# Patient Record
Sex: FEMALE | Race: BLACK OR AFRICAN AMERICAN | NOT HISPANIC OR LATINO
[De-identification: names, ages, dates, MRNs, and addresses within clinical notes are randomized per-mention and may not be internally consistent; named-entity substitution may affect disease eponyms.]

---

## 2023-11-17 DIAGNOSIS — Z00.00 ENCOUNTER FOR GENERAL ADULT MEDICAL EXAMINATION W/OUT ABNORMAL FINDINGS: ICD-10-CM

## 2023-11-22 ENCOUNTER — APPOINTMENT (OUTPATIENT)
Dept: HEMATOLOGY ONCOLOGY | Facility: CLINIC | Age: 48
End: 2023-11-22
Payer: MEDICARE

## 2023-11-22 VITALS
WEIGHT: 140 LBS | DIASTOLIC BLOOD PRESSURE: 68 MMHG | HEIGHT: 67 IN | TEMPERATURE: 98.5 F | SYSTOLIC BLOOD PRESSURE: 134 MMHG | OXYGEN SATURATION: 96 % | RESPIRATION RATE: 16 BRPM | BODY MASS INDEX: 21.97 KG/M2 | HEART RATE: 66 BPM

## 2023-11-22 DIAGNOSIS — Z86.2 PERSONAL HISTORY OF DISEASES OF THE BLOOD AND BLOOD-FORMING ORGANS AND CERTAIN DISORDERS INVOLVING THE IMMUNE MECHANISM: ICD-10-CM

## 2023-11-22 DIAGNOSIS — Z87.19 PERSONAL HISTORY OF OTHER DISEASES OF THE DIGESTIVE SYSTEM: ICD-10-CM

## 2023-11-22 DIAGNOSIS — M15.9 POLYOSTEOARTHRITIS, UNSPECIFIED: ICD-10-CM

## 2023-11-22 DIAGNOSIS — D57.01 HB-SS DISEASE WITH ACUTE CHEST SYNDROME: ICD-10-CM

## 2023-11-22 DIAGNOSIS — Z87.39 PERSONAL HISTORY OF OTHER DISEASES OF THE MUSCULOSKELETAL SYSTEM AND CONNECTIVE TISSUE: ICD-10-CM

## 2023-11-22 DIAGNOSIS — Z87.898 PERSONAL HISTORY OF OTHER SPECIFIED CONDITIONS: ICD-10-CM

## 2023-11-22 DIAGNOSIS — Z83.2 FAMILY HISTORY OF DISEASES OF THE BLOOD AND BLOOD-FORMING ORGANS AND CERTAIN DISORDERS INVOLVING THE IMMUNE MECHANISM: ICD-10-CM

## 2023-11-22 DIAGNOSIS — Z78.9 OTHER SPECIFIED HEALTH STATUS: ICD-10-CM

## 2023-11-22 DIAGNOSIS — G43.901 MIGRAINE, UNSPECIFIED, NOT INTRACTABLE, WITH STATUS MIGRAINOSUS: ICD-10-CM

## 2023-11-22 DIAGNOSIS — G43.911 MIGRAINE, UNSPECIFIED, INTRACTABLE, WITH STATUS MIGRAINOSUS: ICD-10-CM

## 2023-11-22 PROCEDURE — 99205 OFFICE O/P NEW HI 60 MIN: CPT

## 2023-11-22 RX ORDER — ALPRAZOLAM 0.25 MG/1
0.25 TABLET ORAL
Refills: 0 | Status: COMPLETED | COMMUNITY
End: 2023-11-22

## 2023-12-20 ENCOUNTER — RESULT REVIEW (OUTPATIENT)
Age: 48
End: 2023-12-20

## 2023-12-20 ENCOUNTER — APPOINTMENT (OUTPATIENT)
Dept: HEMATOLOGY ONCOLOGY | Facility: CLINIC | Age: 48
End: 2023-12-20
Payer: MEDICARE

## 2023-12-20 VITALS
TEMPERATURE: 98 F | HEART RATE: 79 BPM | RESPIRATION RATE: 17 BRPM | BODY MASS INDEX: 21.22 KG/M2 | WEIGHT: 135.19 LBS | OXYGEN SATURATION: 98 % | DIASTOLIC BLOOD PRESSURE: 66 MMHG | HEIGHT: 67 IN | SYSTOLIC BLOOD PRESSURE: 122 MMHG

## 2023-12-20 PROCEDURE — 99214 OFFICE O/P EST MOD 30 MIN: CPT

## 2023-12-20 NOTE — REVIEW OF SYSTEMS
[Palpitations] : palpitations [Joint Pain] : joint pain [Insomnia] : insomnia [Anxiety] : anxiety [Negative] : Heme/Lymph [FreeTextEntry5] : provoked by pain meds and anxiety  [FreeTextEntry9] : osteoarthritis  [de-identified] : difficulty falling asleep

## 2023-12-20 NOTE — HISTORY OF PRESENT ILLNESS
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 Kiah Newton Patient seen as an adult by Dr. Ruiz  Patient transfused in the following hospitals: [facility] History of reactions-no Patient has history of acute chest syndrome wo intubation and cholecystectomy.  Denies CVA, Leg ulcers, Diabetes, thrombosis, cholecystectomy, AVN w/wo surgery, retinopathy.   Pregnancy x2 Last optho appointment 2022  Takes melatonin 10mg and Benadryl at night for the last year   11/22/2023 Patient complains of knee pain. Had xray found osteoarthritis. Had steroid injection into knee. Given steroids, recommended PT. PT recommended by orthopedics at [facility] with Dr. Talamantes.  Patient recommended for follow up in 3 months. Has chronic in lower back, has had MRI and xrays. Had steroid injections, takes pain medication.  Taking oxycodone. Recent in the ED/hospital in 2022. Transfused blood. Had infections.  Takes hydroxyurea 500mg once a day since August.  [de-identified] : 12/20/2023; Started hydroxyurea no complaints Has insomnia, anxiety improved. Taking melatonin and benadryl

## 2023-12-20 NOTE — CONSULT LETTER
[Dear  ___] : Dear ~SAMREEN, [Sincerely,] : Sincerely, [FreeTextEntry1] : Vidhya Amaro is a patient of mine with sickle cell disease.  Her plan for pain is below. ED plan 1/2 normal saline 2 liters Initial pain medication: Hydromorphone 2mg or Morphine 6mg IV, if partial relief can continue same dose for 3 doses  If no relief from initial dose, can escalate up to hydromorphone 3mg or morphine 8mg for second, if partial relief can give additional dose for total of 3 doses If no relief from second escalated dose and if patient needs escalation to hydromorphone  4mg or morphine 10mg for 3rd dose , patient should get admitted to hospital  Recommend giving up to 3 doses of either hydromorphone and morphine, with 1/2 normal saline 2 liters. If no improvement will need admission. For headaches with pain medication can give fioricet.    [FreeTextEntry3] : Jane Garrett MD

## 2023-12-20 NOTE — ASSESSMENT
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   #Sickle cell disease discussed disease modifying medications including hydroxyurea, l-glutamine, voxelotor, crizanlizumab increase hydroxyurea to 1000mg Patient is not Candidate for transplant/gene therapy at this time     #Pain given pain contract on 11/22/2023 continue oxycodone Istop reviewed ED plan 1/2 normal saline 2 liters Initial pain medication: Hydromorphone 2mg or Morphine 6mg IV, if partial relief can continue same dose for 3 doses  If no relief from initial dose, can escalate up to hydromorphone 3mg or morphine 8mg for second, if partial relief can give additional dose for total of 3 doses If no relief from second escalated dose and if patient needs escalation to hydromorphone  4mg or morphine 10mg for 3rd dose , patient should get admitted to hospital  Recommend giving up to 3 doses of either hydromorphone and morphine, with 1/2 normal saline 2 liters. If no improvement will need admission. For headaches with pain medication can give fioricet.    #Insomnia -referred to Dr. Ko recommended tart cherry juice and magnesium spray    #HCM Last optho appointment 2022 Last pneumonia vaccine unknown Last urine protein/microalbumin unknown Has PCP/OB/gyn unknown   #Next appointment 4-6 weeks with labs   #Labs at next appointment CBC, retic, iron, ferritin, urinalysis, urine toxicology, hgb electrophoresis, CMP, LDH

## 2023-12-21 ENCOUNTER — NON-APPOINTMENT (OUTPATIENT)
Age: 48
End: 2023-12-21

## 2024-01-31 ENCOUNTER — RESULT REVIEW (OUTPATIENT)
Age: 49
End: 2024-01-31

## 2024-01-31 ENCOUNTER — APPOINTMENT (OUTPATIENT)
Dept: HEMATOLOGY ONCOLOGY | Facility: CLINIC | Age: 49
End: 2024-01-31
Payer: COMMERCIAL

## 2024-01-31 VITALS
HEIGHT: 67 IN | HEART RATE: 75 BPM | DIASTOLIC BLOOD PRESSURE: 54 MMHG | OXYGEN SATURATION: 98 % | SYSTOLIC BLOOD PRESSURE: 110 MMHG | TEMPERATURE: 97.7 F | WEIGHT: 132.25 LBS | BODY MASS INDEX: 20.76 KG/M2 | RESPIRATION RATE: 16 BRPM

## 2024-01-31 PROCEDURE — 99214 OFFICE O/P EST MOD 30 MIN: CPT

## 2024-01-31 RX ORDER — LIDOCAINE 5% 700 MG/1
5 PATCH TOPICAL
Qty: 30 | Refills: 0 | Status: COMPLETED | COMMUNITY
End: 2024-01-31

## 2024-01-31 NOTE — ASSESSMENT
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   #Sickle cell disease discussed disease modifying medications including hydroxyurea, l-glutamine, voxelotor, crizanlizumab increase hydroxyurea to 1000mg Patient is not Candidate for transplant/gene therapy at this time     #Pain given pain contract on 11/22/2023 continue oxycodone Istop reviewed ED plan 1/2 normal saline 2 liters Initial pain medication: Hydromorphone 2mg or Morphine 6mg IV, if partial relief can continue same dose for 3 doses  If no relief from initial dose, can escalate up to hydromorphone 3mg or morphine 8mg for second, if partial relief can give additional dose for total of 3 doses If no relief from second escalated dose and if patient needs escalation to hydromorphone  4mg or morphine 10mg for 3rd dose , patient should get admitted to hospital For itching: Phenergan IV, Hydroxyzine IM  Recommend giving up to 3 doses of either hydromorphone and morphine, with 1/2 normal saline 2 liters. If no improvement will need admission. For headaches with pain medication can give fioricet.    #Insomnia -trazodone will start recommended tart cherry juice and magnesium spray    #HCM Last optho appointment 2024 Last pneumonia vaccine next appointment Last urine protein/microalbumin within normal limits Has PCP/OB/gyn unknown   #Next appointment 8 weeks with labs   #Labs at next appointment CBC, retic, iron, ferritin, urinalysis, urine toxicology, hgb electrophoresis, CMP, LDH

## 2024-01-31 NOTE — REVIEW OF SYSTEMS
[Palpitations] : palpitations [Joint Pain] : joint pain [Insomnia] : insomnia [Anxiety] : anxiety [Negative] : Heme/Lymph [Leg Claudication] : intermittent leg claudication [FreeTextEntry5] :  maybe from sickle? [FreeTextEntry9] : osteoarthritis  [de-identified] : difficulty falling asleep

## 2024-01-31 NOTE — HISTORY OF PRESENT ILLNESS
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 Kiah Newton Patient seen as an adult by Dr. Ruiz  Patient transfused in the following hospitals: [facility] History of reactions-no Patient has history of acute chest syndrome wo intubation and cholecystectomy.  Denies CVA, Leg ulcers, Diabetes, thrombosis, cholecystectomy, AVN w/wo surgery, retinopathy.   Pregnancy x2 Last optho appointment 2022  Takes melatonin 10mg and Benadryl at night for the last year   11/22/2023 Patient complains of knee pain. Had xray found osteoarthritis. Had steroid injection into knee. Given steroids, recommended PT. PT recommended by orthopedics at [facility] with Dr. Talamantes.  Patient recommended for follow up in 3 months. Has chronic in lower back, has had MRI and xrays. Had steroid injections, takes pain medication.  Taking oxycodone. Recent in the ED/hospital in 2022. Transfused blood. Had infections.  Takes hydroxyurea 500mg once a day since August.  [de-identified] : 12/20/2023; Started hydroxyurea no complaints Has insomnia, anxiety improved. Taking melatonin and benadryl  1/31/2024 Has not been sleeping Had crisis  on 12/22/2023, did not go to the ED 1/21/2024, had crisis  Taking hydroxyurea 1000mg daily, loose stools Saw opthalmology Dee Woody, retinopathy in both eyes, did not require laser surgery

## 2024-03-20 ENCOUNTER — RESULT REVIEW (OUTPATIENT)
Age: 49
End: 2024-03-20

## 2024-03-20 ENCOUNTER — APPOINTMENT (OUTPATIENT)
Dept: HEMATOLOGY ONCOLOGY | Facility: CLINIC | Age: 49
End: 2024-03-20
Payer: COMMERCIAL

## 2024-03-20 VITALS
DIASTOLIC BLOOD PRESSURE: 70 MMHG | SYSTOLIC BLOOD PRESSURE: 135 MMHG | BODY MASS INDEX: 21.19 KG/M2 | WEIGHT: 135 LBS | HEART RATE: 68 BPM | TEMPERATURE: 98.3 F | HEIGHT: 67 IN | RESPIRATION RATE: 16 BRPM | OXYGEN SATURATION: 96 %

## 2024-03-20 DIAGNOSIS — D57.00 HB-SS DISEASE WITH CRISIS, UNSPECIFIED: ICD-10-CM

## 2024-03-20 DIAGNOSIS — G47.00 INSOMNIA, UNSPECIFIED: ICD-10-CM

## 2024-03-20 PROCEDURE — 99215 OFFICE O/P EST HI 40 MIN: CPT

## 2024-03-20 RX ORDER — IBUPROFEN 800 MG/1
800 TABLET, FILM COATED ORAL
Refills: 0 | Status: ACTIVE | COMMUNITY

## 2024-03-20 NOTE — HISTORY OF PRESENT ILLNESS
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 Kiah Newton Patient seen as an adult by Dr. Ruiz  Patient transfused in the following hospitals: [facility] History of reactions-no Patient has history of acute chest syndrome wo intubation and cholecystectomy splenectomy.  Denies CVA, Leg ulcers, Diabetes, thrombosis, cholecystectomy, AVN w/wo surgery, retinopathy.   Pregnancy x2 Last optho appointment 2022  Takes melatonin 10mg and Benadryl at night for the last year   11/22/2023 Patient complains of knee pain. Had xray found osteoarthritis. Had steroid injection into knee. Given steroids, recommended PT. PT recommended by orthopedics at [facility] with Dr. Talamantes.  Patient recommended for follow up in 3 months. Has chronic in lower back, has had MRI and xrays. Had steroid injections, takes pain medication.  Taking oxycodone. Recent in the ED/hospital in 2022. Transfused blood. Had infections.  Takes hydroxyurea 500mg once a day since August.  [de-identified] : 12/20/2023; Started hydroxyurea no complaints Has insomnia, anxiety improved. Taking melatonin and benadryl  1/31/2024 Has not been sleeping Had crisis  on 12/22/2023, did not go to the ED 1/21/2024, had crisis  Taking hydroxyurea 1000mg daily, loose stools Saw opthalmology Dee Woody, retinopathy in both eyes, did not require laser surgery  3/20/2024 Had pain in elbow started on Saturday Patient turned on fan and possibly hit elbow Elbow was tender and achy Stopped taking oxycodone

## 2024-03-20 NOTE — REVIEW OF SYSTEMS
[Palpitations] : palpitations [FreeTextEntry9] : osteoarthritis  [FreeTextEntry5] :  maybe from sickle? [de-identified] : difficulty falling asleep

## 2024-03-20 NOTE — CONSULT LETTER
[FreeTextEntry1] : Vidhya Amaro is a patient of mine with sickle cell disease.  Her plan for pain is below. ED plan 1/2 normal saline 2 liters Initial pain medication: Hydromorphone 2mg or Morphine 6mg IV, if partial relief can continue same dose for 3 doses  If no relief from initial dose, can escalate up to hydromorphone 3mg or morphine 8mg for second, if partial relief can give additional dose for total of 3 doses If no relief from second escalated dose and if patient needs escalation to hydromorphone  4mg or morphine 10mg for 3rd dose , patient should get admitted to hospital  Recommend giving up to 3 doses of either hydromorphone and morphine, with 1/2 normal saline 2 liters. If no improvement will need admission. For headaches with pain medication can give fioricet.    [FreeTextEntry3] : Jane Garrett MD

## 2024-03-20 NOTE — ASSESSMENT
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  #Pain crisis March 2024 Will do infusion with 1/2 normal saline 2 liters will add morphine, hydroxyzine, pepcid for itching   #Sickle cell disease discussed disease modifying medications including hydroxyurea, l-glutamine, voxelotor, crizanlizumab increase hydroxyurea to 1000mg Patient is not Candidate for transplant/gene therapy at this time  #Blepharoplasty -spoke with plastic surgery office 1 hour anesthesia with propofol will need to have hgb>9 before surgery will see patient week before surgery and transfuse if needed will send patient for EKG will do coags today with pregnancy test, preop visit April     #Pain given pain contract on 11/22/2023 continue oxycodone Istop reviewed ED plan 1/2 normal saline 2 liters Initial pain medication: Hydromorphone 2mg or Morphine 6mg IV, if partial relief can continue same dose for 3 doses  If no relief from initial dose, can escalate up to hydromorphone 3mg or morphine 8mg for second, if partial relief can give additional dose for total of 3 doses If no relief from second escalated dose and if patient needs escalation to hydromorphone  4mg or morphine 10mg for 3rd dose , patient should get admitted to hospital For itching: Phenergan IV, Hydroxyzine IM  Recommend giving up to 3 doses of either hydromorphone and morphine, with 1/2 normal saline 2 liters. If no improvement will need admission. For headaches with pain medication can give fioricet.    #Insomnia -trazodone will start recommended tart cherry juice and magnesium spray    #HCM Last optho appointment 2024 Last pneumonia vaccine next appointment Last urine protein/microalbumin within normal limits Has PCP/OB/gyn unknown   #Next appointment April 2024 with possible transfusion and labs   #Labs at next appointment CBC, retic, iron, ferritin, urinalysis, urine toxicology, hgb electrophoresis, CMP, LDH

## 2024-03-27 ENCOUNTER — APPOINTMENT (OUTPATIENT)
Dept: HEMATOLOGY ONCOLOGY | Facility: CLINIC | Age: 49
End: 2024-03-27

## 2024-04-11 ENCOUNTER — NON-APPOINTMENT (OUTPATIENT)
Age: 49
End: 2024-04-11

## 2024-04-17 ENCOUNTER — RESULT REVIEW (OUTPATIENT)
Age: 49
End: 2024-04-17

## 2024-04-17 ENCOUNTER — APPOINTMENT (OUTPATIENT)
Dept: HEMATOLOGY ONCOLOGY | Facility: CLINIC | Age: 49
End: 2024-04-17
Payer: COMMERCIAL

## 2024-04-17 VITALS
RESPIRATION RATE: 17 BRPM | DIASTOLIC BLOOD PRESSURE: 64 MMHG | TEMPERATURE: 97.7 F | SYSTOLIC BLOOD PRESSURE: 135 MMHG | BODY MASS INDEX: 21.43 KG/M2 | HEIGHT: 67 IN | WEIGHT: 136.56 LBS | HEART RATE: 79 BPM | OXYGEN SATURATION: 99 %

## 2024-04-17 DIAGNOSIS — Z01.818 ENCOUNTER FOR OTHER PREPROCEDURAL EXAMINATION: ICD-10-CM

## 2024-04-17 DIAGNOSIS — G89.29 LOW BACK PAIN, UNSPECIFIED: ICD-10-CM

## 2024-04-17 DIAGNOSIS — M54.50 LOW BACK PAIN, UNSPECIFIED: ICD-10-CM

## 2024-04-17 DIAGNOSIS — M17.11 UNILATERAL PRIMARY OSTEOARTHRITIS, RIGHT KNEE: ICD-10-CM

## 2024-04-17 PROCEDURE — 99214 OFFICE O/P EST MOD 30 MIN: CPT

## 2024-04-18 NOTE — HISTORY OF PRESENT ILLNESS
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 Kiah Newton Patient seen as an adult by Dr. Ruiz  Patient transfused in the following hospitals: [facility] History of reactions-no Patient has history of acute chest syndrome wo intubation and cholecystectomy splenectomy.  Denies CVA, Leg ulcers, Diabetes, thrombosis, cholecystectomy, AVN w/wo surgery, retinopathy.   Pregnancy x2 Last optho appointment 2022  Takes melatonin 10mg and Benadryl at night for the last year   11/22/2023 Patient complains of knee pain. Had xray found osteoarthritis. Had steroid injection into knee. Given steroids, recommended PT. PT recommended by orthopedics at [facility] with Dr. Talamantes.  Patient recommended for follow up in 3 months. Has chronic in lower back, has had MRI and xrays. Had steroid injections, takes pain medication.  Taking oxycodone. Recent in the ED/hospital in 2022. Transfused blood. Had infections.  Takes hydroxyurea 500mg once a day since August.  [de-identified] : 12/20/2023; Started hydroxyurea no complaints Has insomnia, anxiety improved. Taking melatonin and benadryl  1/31/2024 Has not been sleeping Had crisis  on 12/22/2023, did not go to the ED 1/21/2024, had crisis  Taking hydroxyurea 1000mg daily, loose stools Saw opthalmology Dee Woody, retinopathy in both eyes, did not require laser surgery  3/20/2024 Had pain in elbow started on Saturday Patient turned on fan and possibly hit elbow Elbow was tender and achy Stopped taking oxycodone  4/17/2024 if hgb<9, will do transfusion before May 1st procedure taking hydroxyurea, every month takes 3 month denies reactions does not require premedications feels fine has low back pain, took oxycodond 5mg -->recommend tiger balm for lower back

## 2024-04-18 NOTE — ASSESSMENT
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 Vinita garibay Address: [address] [place] [address] Hours:  Open  Closes 4PM Phone: [phone]  #Preopeartive Blepharoplasty -spoke with plastic surgery office 1 hour anesthesia with propofol will need to have hgb>9 before surgery, per CHARLY 2020 guidelines for sickle cell disease Hgb<9 today, will schedule for transfusion will send patient for EKG will do coags today with pregnancy test  #Sickle cell disease discussed disease modifying medications including hydroxyurea, l-glutamine, voxelotor, crizanlizumab c/w hydroxyurea to 1000mg Patient is not Candidate for transplant/gene therapy at this time       #Pain given pain contract on 11/22/2023 continue oxycodone Istop reviewed ED plan 1/2 normal saline 2 liters Initial pain medication: Hydromorphone 2mg or Morphine 6mg IV, if partial relief can continue same dose for 3 doses  If no relief from initial dose, can escalate up to hydromorphone 3mg or morphine 8mg for second, if partial relief can give additional dose for total of 3 doses If no relief from second escalated dose and if patient needs escalation to hydromorphone  4mg or morphine 10mg for 3rd dose , patient should get admitted to hospital For itching: Phenergan IV, Hydroxyzine IM  Recommend giving up to 3 doses of either hydromorphone and morphine, with 1/2 normal saline 2 liters. If no improvement will need admission. For headaches with pain medication can give fioricet.    #Insomnia -trazodone will start recommended tart cherry juice and magnesium spray    #HCM Last optho appointment 2024 Last pneumonia vaccine next appointment Last urine protein/microalbumin within normal limits Has PCP/OB/gyn unknown   #Next appointment April 2024 with possible transfusion and labs   #Labs at next appointment CBC, retic, iron, ferritin, urinalysis, urine toxicology, hgb electrophoresis, CMP, LDH  if hgb<9, will do transfusion before May 1st procedure

## 2024-04-18 NOTE — REVIEW OF SYSTEMS
[Palpitations] : palpitations [Joint Pain] : joint pain [Insomnia] : insomnia [Anxiety] : anxiety [Negative] : Heme/Lymph [FreeTextEntry5] :  maybe from sickle? [FreeTextEntry9] : osteoarthritis  [de-identified] : difficulty falling asleep

## 2024-04-23 ENCOUNTER — NON-APPOINTMENT (OUTPATIENT)
Age: 49
End: 2024-04-23

## 2024-04-29 ENCOUNTER — RESULT REVIEW (OUTPATIENT)
Age: 49
End: 2024-04-29

## 2024-06-03 ENCOUNTER — APPOINTMENT (OUTPATIENT)
Dept: HEMATOLOGY ONCOLOGY | Facility: CLINIC | Age: 49
End: 2024-06-03
Payer: MEDICARE

## 2024-06-03 ENCOUNTER — RESULT REVIEW (OUTPATIENT)
Age: 49
End: 2024-06-03

## 2024-06-03 VITALS
BODY MASS INDEX: 21.35 KG/M2 | HEART RATE: 74 BPM | WEIGHT: 136 LBS | RESPIRATION RATE: 16 BRPM | SYSTOLIC BLOOD PRESSURE: 136 MMHG | HEIGHT: 67 IN | OXYGEN SATURATION: 96 % | TEMPERATURE: 98.2 F | DIASTOLIC BLOOD PRESSURE: 67 MMHG

## 2024-06-03 DIAGNOSIS — D57.1 SICKLE-CELL DISEASE W/OUT CRISIS: ICD-10-CM

## 2024-06-03 DIAGNOSIS — E55.9 VITAMIN D DEFICIENCY, UNSPECIFIED: ICD-10-CM

## 2024-06-03 PROCEDURE — 99214 OFFICE O/P EST MOD 30 MIN: CPT

## 2024-06-03 RX ORDER — HYDROXYZINE HYDROCHLORIDE 50 MG/1
50 TABLET ORAL 3 TIMES DAILY
Qty: 90 | Refills: 5 | Status: ACTIVE | COMMUNITY
Start: 2024-01-31 | End: 1900-01-01

## 2024-06-03 RX ORDER — ERGOCALCIFEROL 1.25 MG/1
50000 CAPSULE ORAL
Qty: 10 | Refills: 0 | Status: ACTIVE | COMMUNITY
Start: 2023-11-24 | End: 1900-01-01

## 2024-06-03 RX ORDER — OXYCODONE 10 MG/1
10 TABLET ORAL EVERY 6 HOURS
Qty: 120 | Refills: 0 | Status: ACTIVE | COMMUNITY
Start: 1900-01-01 | End: 1900-01-01

## 2024-06-03 RX ORDER — FOLIC ACID 1 MG/1
1 TABLET ORAL DAILY
Qty: 30 | Refills: 5 | Status: ACTIVE | COMMUNITY

## 2024-06-03 RX ORDER — HYDROXYUREA 500 MG/1
500 CAPSULE ORAL DAILY
Qty: 60 | Refills: 5 | Status: ACTIVE | COMMUNITY
Start: 1900-01-01 | End: 1900-01-01

## 2024-06-03 RX ORDER — TRAZODONE HYDROCHLORIDE 50 MG/1
50 TABLET ORAL
Qty: 60 | Refills: 0 | Status: ACTIVE | COMMUNITY
Start: 2024-01-31 | End: 1900-01-01

## 2024-06-03 RX ORDER — GABAPENTIN 800 MG/1
800 TABLET, COATED ORAL 3 TIMES DAILY
Qty: 90 | Refills: 5 | Status: ACTIVE | COMMUNITY
Start: 2024-03-20 | End: 1900-01-01

## 2024-06-05 PROBLEM — D57.1 SICKLE CELL DISEASE WITHOUT CRISIS: Status: ACTIVE | Noted: 2023-12-20

## 2024-06-05 PROBLEM — E55.9 VITAMIN D DEFICIENCY: Status: ACTIVE | Noted: 2023-11-24

## 2024-06-05 NOTE — REVIEW OF SYSTEMS
[Palpitations] : palpitations [Joint Pain] : joint pain [Insomnia] : insomnia [Anxiety] : anxiety [Negative] : Heme/Lymph [FreeTextEntry5] :  maybe from sickle? [FreeTextEntry9] : osteoarthritis  [de-identified] : difficulty falling asleep

## 2024-06-05 NOTE — ASSESSMENT
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 Vinita garibay Address: [address] [place] [address] Hours:  Open  Closes 4PM Phone: [phone]    #Sickle cell disease discussed disease modifying medications including hydroxyurea, l-glutamine, voxelotor, crizanlizumab c/w hydroxyurea to 1000mg Patient is not Candidate for transplant/gene therapy at this time       #Pain given pain contract on 11/22/2023 continue oxycodone Istop reviewed ED plan 1/2 normal saline 2 liters Initial pain medication: Hydromorphone 2mg or Morphine 6mg IV, if partial relief can continue same dose for 3 doses  If no relief from initial dose, can escalate up to hydromorphone 3mg or morphine 8mg for second, if partial relief can give additional dose for total of 3 doses If no relief from second escalated dose and if patient needs escalation to hydromorphone  4mg or morphine 10mg for 3rd dose , patient should get admitted to hospital For itching: Phenergan IV, Hydroxyzine IM  Recommend giving up to 3 doses of either hydromorphone and morphine, with 1/2 normal saline 2 liters. If no improvement will need admission. For headaches with pain medication can give fioricet.    #Insomnia -trazodone will start recommended tart cherry juice and magnesium spray    #HCM Last optho appointment 2024 Last pneumonia vaccine next appointment Last urine protein/microalbumin within normal limits Has PCP/OB/gyn unknown   #Next appointment April 2024 with possible transfusion and labs   #Labs at next appointment CBC, retic, iron, ferritin, urinalysis, urine toxicology, hgb electrophoresis, CMP, LDH  if hgb<9, will do transfusion before May 1st procedure

## 2024-06-05 NOTE — HISTORY OF PRESENT ILLNESS
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 Patient seen in pediatrics by Dr. Kiah Newton Patient seen as an adult by Dr. Ruiz  Patient transfused in the following hospitals: [facility] History of reactions-no Patient has history of acute chest syndrome wo intubation and cholecystectomy splenectomy.  Denies CVA, Leg ulcers, Diabetes, thrombosis, cholecystectomy, AVN w/wo surgery, retinopathy.   Pregnancy x2 Last optho appointment 2022  Takes melatonin 10mg and Benadryl at night for the last year   11/22/2023 Patient complains of knee pain. Had xray found osteoarthritis. Had steroid injection into knee. Given steroids, recommended PT. PT recommended by orthopedics at [facility] with Dr. Talamantes.  Patient recommended for follow up in 3 months. Has chronic in lower back, has had MRI and xrays. Had steroid injections, takes pain medication.  Taking oxycodone. Recent in the ED/hospital in 2022. Transfused blood. Had infections.  Takes hydroxyurea 500mg once a day since August.  [de-identified] : 12/20/2023; Started hydroxyurea no complaints Has insomnia, anxiety improved. Taking melatonin and benadryl  1/31/2024 Has not been sleeping Had crisis  on 12/22/2023, did not go to the ED 1/21/2024, had crisis  Taking hydroxyurea 1000mg daily, loose stools Saw opthalmology Dee Bong, retinopathy in both eyes, did not require laser surgery  3/20/2024 Had pain in elbow started on Saturday Patient turned on fan and possibly hit elbow Elbow was tender and achy Stopped taking oxycodone  4/17/2024 if hgb<9, will do transfusion before May 1st procedure taking hydroxyurea, every month takes 3 month denies reactions does not require premedications feels fine has low back pain, took oxycodond 5mg -->recommend tiger balm for lower back  6/3/2024 feels good ran out of hydroxyurea

## 2024-08-14 ENCOUNTER — APPOINTMENT (OUTPATIENT)
Dept: HEMATOLOGY ONCOLOGY | Facility: CLINIC | Age: 49
End: 2024-08-14
Payer: MEDICARE

## 2024-08-14 ENCOUNTER — RESULT REVIEW (OUTPATIENT)
Age: 49
End: 2024-08-14

## 2024-08-14 VITALS
BODY MASS INDEX: 21.53 KG/M2 | TEMPERATURE: 98.6 F | RESPIRATION RATE: 16 BRPM | HEART RATE: 75 BPM | SYSTOLIC BLOOD PRESSURE: 125 MMHG | WEIGHT: 137.19 LBS | DIASTOLIC BLOOD PRESSURE: 69 MMHG | HEIGHT: 67 IN | OXYGEN SATURATION: 97 %

## 2024-08-14 DIAGNOSIS — D57.1 SICKLE-CELL DISEASE W/OUT CRISIS: ICD-10-CM

## 2024-08-14 PROCEDURE — 99214 OFFICE O/P EST MOD 30 MIN: CPT

## 2024-08-14 NOTE — ASSESSMENT
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 Vinita garibay Address: [address] [place] [address] Hours:  Open  Closes 4PM Phone: [phone]    #Sickle cell disease discussed disease modifying medications including hydroxyurea, l-glutamine, voxelotor, crizanlizumab c/w hydroxyurea to 1000mg Patient is not Candidate for transplant/gene therapy at this time       #Pain given pain contract on 11/22/2023 continue oxycodone Istop reviewed ED plan 1/2 normal saline 2 liters Initial pain medication: Hydromorphone 2mg or Morphine 6mg IV, if partial relief can continue same dose for 3 doses  If no relief from initial dose, can escalate up to hydromorphone 3mg or morphine 8mg for second, if partial relief can give additional dose for total of 3 doses If no relief from second escalated dose and if patient needs escalation to hydromorphone  4mg or morphine 10mg for 3rd dose , patient should get admitted to hospital For itching: Phenergan IV, Hydroxyzine IM  Recommend giving up to 3 doses of either hydromorphone and morphine, with 1/2 normal saline 2 liters. If no improvement will need admission. For headaches with pain medication can give fioricet.    #Insomnia -trazodone will start recommended tart cherry juice and magnesium spray    #HCM Last optho appointment 2024 Last pneumonia vaccine next appointment Last urine protein/microalbumin within normal limits Has PCP/OB/gyn unknown   #Next appointment April 2024 with possible transfusion and labs   #Labs at next appointment CBC, retic, iron, ferritin, urinalysis, urine toxicology, hgb electrophoresis, CMP, LDH  if hgb<9, will do transfusion before May 1st procedure

## 2024-08-14 NOTE — ASSESSMENT
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 Vinita garibay Address: [address] [place] [address] Hours:  Open  Closes 4PM Phone: [phone]    #Sickle cell disease discussed disease modifying medications including hydroxyurea, l-glutamine, voxelotor, crizanlizumab c/w hydroxyurea to 1000mg Patient is not Candidate for transplant/gene therapy at this time       #Pain given pain contract on 11/22/2023 continue oxycodone Istop reviewed ED plan 1/2 normal saline 2 liters Initial pain medication: Hydromorphone 2mg or Morphine 6mg IV, if partial relief can continue same dose for 3 doses  If no relief from initial dose, can escalate up to hydromorphone 3mg or morphine 8mg for second, if partial relief can give additional dose for total of 3 doses If no relief from second escalated dose and if patient needs escalation to hydromorphone  4mg or morphine 10mg for 3rd dose , patient should get admitted to hospital For itching: Phenergan IV, Hydroxyzine IM  Recommend giving up to 3 doses of either hydromorphone and morphine, with 1/2 normal saline 2 liters. If no improvement will need admission. For headaches with pain medication can give fioricet.    #Insomnia -trazodone will start recommended tart cherry juice and magnesium spray    #HCM Last optho appointment 2024 Last pneumonia vaccine next appointment Last urine protein/microalbumin within normal limits Has PCP/OB/gyn unknown   #Next appointment April 2024 with possible transfusion and labs   #Labs at next appointment CBC, retic, iron, ferritin, urinalysis, urine toxicology, hgb electrophoresis, CMP, LDH  if hgb<9, will do transfusion before May 1st procedure

## 2024-08-14 NOTE — REVIEW OF SYSTEMS
[Palpitations] : palpitations [Joint Pain] : joint pain [Insomnia] : insomnia [Anxiety] : anxiety [Negative] : Heme/Lymph [FreeTextEntry5] :  maybe from sickle? [FreeTextEntry9] : osteoarthritis  [de-identified] : difficulty falling asleep

## 2024-08-14 NOTE — HISTORY OF PRESENT ILLNESS
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 Patient seen in pediatrics by Dr. Kiah Newton Patient seen as an adult by Dr. Ruiz  Patient transfused in the following hospitals: [facility] History of reactions-no Patient has history of acute chest syndrome wo intubation and cholecystectomy splenectomy.  Denies CVA, Leg ulcers, Diabetes, thrombosis, cholecystectomy, AVN w/wo surgery, retinopathy.   Pregnancy x2 Last optho appointment 2022  Takes melatonin 10mg and Benadryl at night for the last year   11/22/2023 Patient complains of knee pain. Had xray found osteoarthritis. Had steroid injection into knee. Given steroids, recommended PT. PT recommended by orthopedics at [facility] with Dr. Talamantes.  Patient recommended for follow up in 3 months. Has chronic in lower back, has had MRI and xrays. Had steroid injections, takes pain medication.  Taking oxycodone. Recent in the ED/hospital in 2022. Transfused blood. Had infections.  Takes hydroxyurea 500mg once a day since August.  [de-identified] : 2023; Started hydroxyurea no complaints Has insomnia, anxiety improved. 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, did not require laser surgery  3/20/2024 Had pain in elbow started on Saturday Patient turned on fan and possibly hit elbow Elbow was tender and achy Stopped taking oxycodone  2024 if hgb<9, will do transfusion before May 1st procedure taking hydroxyurea, every month takes 3 month denies reactions does not require premedications feels fine has low back pain, took oxycodond 5mg -->recommend tiger balm for lower back  6/3/2024 feels good ran out of hydroxyurea   2024 Had friend with sickle cell disease, passed away from kidney disease Had a  yesterday Ran out of hydroxyurea 5 days ago Going to [place] had injection in knee

## 2024-08-14 NOTE — REVIEW OF SYSTEMS
[Palpitations] : palpitations [Joint Pain] : joint pain [Insomnia] : insomnia [Anxiety] : anxiety [Negative] : Heme/Lymph [FreeTextEntry5] :  maybe from sickle? [FreeTextEntry9] : osteoarthritis  [de-identified] : difficulty falling asleep

## 2024-08-14 NOTE — HISTORY OF PRESENT ILLNESS
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 Kiah Newton Patient seen as an adult by Dr. 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  Denies CVA, Leg ulcers, Diabetes, thrombosis, cholecystectomy, AVN w/wo surgery, retinopathy.   Pregnancy x2 Last optho appointment 2022  Takes melatonin 10mg and Benadryl at night for the last year   11/22/2023 Patient complains of knee pain. Had xray found osteoarthritis. Had steroid injection into knee. Given steroids, recommended PT. PT recommended by orthopedics at [facility] with Dr. Talamantes.  Patient recommended for follow up in 3 months. Has chronic in lower back, has had MRI and xrays. Had steroid injections, takes pain medication.  Taking oxycodone. Recent in the ED/hospital in 2022. Transfused blood. Had infections.  Takes hydroxyurea 500mg once a day since August.  [de-identified] : 2023; Started hydroxyurea no complaints Has insomnia, anxiety improved. 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, did not require laser surgery  3/20/2024 Had pain in elbow started on Saturday Patient turned on fan and possibly hit elbow Elbow was tender and achy Stopped taking oxycodone  2024 if hgb<9, will do transfusion before May 1st procedure taking hydroxyurea, every month takes 3 month denies reactions does not require premedications feels fine has low back pain, took oxycodond 5mg -->recommend tiger balm for lower back  6/3/2024 feels good ran out of hydroxyurea   2024 Had friend with sickle cell disease, passed away from kidney disease Had a  yesterday Ran out of hydroxyurea 5 days ago Going to [place] had injection in knee

## 2024-10-15 ENCOUNTER — RESULT REVIEW (OUTPATIENT)
Age: 49
End: 2024-10-15

## 2024-10-15 ENCOUNTER — APPOINTMENT (OUTPATIENT)
Dept: HEMATOLOGY ONCOLOGY | Facility: CLINIC | Age: 49
End: 2024-10-15
Payer: MEDICARE

## 2024-10-15 VITALS
WEIGHT: 135.25 LBS | RESPIRATION RATE: 16 BRPM | SYSTOLIC BLOOD PRESSURE: 130 MMHG | BODY MASS INDEX: 21.23 KG/M2 | HEART RATE: 83 BPM | HEIGHT: 67 IN | TEMPERATURE: 98.5 F | OXYGEN SATURATION: 99 % | DIASTOLIC BLOOD PRESSURE: 61 MMHG

## 2024-10-15 DIAGNOSIS — E55.9 VITAMIN D DEFICIENCY, UNSPECIFIED: ICD-10-CM

## 2024-10-15 DIAGNOSIS — R14.0 ABDOMINAL DISTENSION (GASEOUS): ICD-10-CM

## 2024-10-15 DIAGNOSIS — D57.1 SICKLE-CELL DISEASE W/OUT CRISIS: ICD-10-CM

## 2024-10-15 PROCEDURE — 99214 OFFICE O/P EST MOD 30 MIN: CPT

## 2024-10-15 RX ORDER — SIMETHICONE 180 MG
180 CAPSULE ORAL 4 TIMES DAILY
Qty: 120 | Refills: 5 | Status: ACTIVE | COMMUNITY
Start: 2024-10-15 | End: 1900-01-01

## 2024-10-18 ENCOUNTER — NON-APPOINTMENT (OUTPATIENT)
Age: 49
End: 2024-10-18

## 2024-11-07 ENCOUNTER — RX RENEWAL (OUTPATIENT)
Age: 49
End: 2024-11-07

## 2024-11-07 RX ORDER — ERGOCALCIFEROL 1.25 MG/1
1.25 MG CAPSULE, LIQUID FILLED ORAL
Qty: 10 | Refills: 1 | Status: ACTIVE | COMMUNITY
Start: 2024-11-07 | End: 1900-01-01

## 2024-11-08 ENCOUNTER — NON-APPOINTMENT (OUTPATIENT)
Age: 49
End: 2024-11-08